# Patient Record
Sex: MALE | Race: WHITE
[De-identification: names, ages, dates, MRNs, and addresses within clinical notes are randomized per-mention and may not be internally consistent; named-entity substitution may affect disease eponyms.]

---

## 2019-08-08 ENCOUNTER — HOSPITAL ENCOUNTER (EMERGENCY)
Dept: HOSPITAL 33 - ED | Age: 34
Discharge: HOME | End: 2019-08-08
Payer: COMMERCIAL

## 2019-08-08 VITALS — SYSTOLIC BLOOD PRESSURE: 110 MMHG | DIASTOLIC BLOOD PRESSURE: 71 MMHG

## 2019-08-08 VITALS — OXYGEN SATURATION: 98 % | HEART RATE: 83 BPM

## 2019-08-08 DIAGNOSIS — S87.81XA: ICD-10-CM

## 2019-08-08 DIAGNOSIS — M79.604: Primary | ICD-10-CM

## 2019-08-08 PROCEDURE — 73590 X-RAY EXAM OF LOWER LEG: CPT

## 2019-08-08 PROCEDURE — 36000 PLACE NEEDLE IN VEIN: CPT

## 2019-08-08 PROCEDURE — 96374 THER/PROPH/DIAG INJ IV PUSH: CPT

## 2019-08-08 PROCEDURE — 96375 TX/PRO/DX INJ NEW DRUG ADDON: CPT

## 2019-08-08 PROCEDURE — 99284 EMERGENCY DEPT VISIT MOD MDM: CPT

## 2019-08-08 NOTE — XRAY
Indication: Pain following injury.



Comparison: None



2 views of the right lower leg obtained.  No bony, articular, or soft tissue

abnormalities.

## 2019-08-08 NOTE — ERPHSYRPT
- History of Present Illness


Time Seen by Provider: 08/08/19 14:08


Source: patient


Exam Limitations: no limitations


Patient Subjective Stated Complaint: states was working at [employer] today 

and got right lower leg caught between two logs.  pain to right lower leg.


Triage Nursing Assessment: to room per w/c.  skin w/d, color normal, resp easy.

  noted moderate swelling to right lower leg.  leg warm, good pedal pulse, good 

cap refill.


Physician History: 





33-year-old white male arrives with complaint of pain in his right leg symptoms 

since 30 minutes patient states his leg was caught between 2 logs sawmill.


Patient complains of pain in the right leg he states he was pinched between the 

2 logs.


Patient has pain with any movement of his foot or ankle located in the right 

mid leg.








Past medical history is negative.


Past surgical history is negative.


Social history patient denies illicit drug use or problems with drugs


Method of Injury: other (right leg caught between 2 logs at work)


Occurred: just prior to arrival (30 minutes prior to arri)


Quality: constant, aching


Severity of Pain-Max: moderate


Severity of Pain-Current: moderate


Lower Extremities Pain: leg: right


Modifying Factors: Improves With: movement (pain worse with movement of ankle 

or foot pain located at the mid leg)


Allergies/Adverse Reactions: 








No Known Drug Allergies Allergy (Unverified 08/08/19 14:10)


 





Hx Tetanus, Diphtheria Vaccination/Date Given: No


Hx Influenza Vaccination/Date Given: No


Hx Pneumococcal Vaccination/Date Given: No





- Review of Systems


Constitutional: No Fever, No Chills


Eyes: No Symptoms


Ears, Nose, & Throat: No Symptoms


Respiratory: No Cough, No Dyspnea


Cardiac: No Chest Pain, No Edema, No Syncope


Abdominal/Gastrointestinal: No Abdominal Pain, No Nausea, No Vomiting, No 

Diarrhea


Genitourinary Symptoms: No Dysuria


Musculoskeletal: Other (Pain right leg)


Skin: Other (eecchymosis over right calf)


Neurological: No Dizziness, No Focal Weakness, No Sensory Changes


Psychological: No Symptoms


Endocrine: No Symptoms


All Other Systems: Reviewed and Negative





- Past Medical History


Pertinent Past Medical History: No





- Past Surgical History


Past Surgical History: No





- Social History


Smoking Status: Former smoker


Exposure to second hand smoke: Yes


Drug Use: none


Patient Lives Alone: No





- Nursing Vital Signs


Nursing Vital Signs: 


 Initial Vital Signs











Temperature  98.1 F   08/08/19 13:58


 


Pulse Rate  83   08/08/19 13:58


 


Respiratory Rate  16   08/08/19 13:58


 


Blood Pressure  114/67   08/08/19 13:58


 


O2 Sat by Pulse Oximetry  98   08/08/19 13:58








 Pain Scale











Pain Intensity                 10

















- Physical Exam


General Appearance: moderate distress, alert


Eyes, Ears, Nose, Throat Exam: moist mucous membranes


Neck Exam: non-tender, supple


Cardiovascular/Respiratory Exam: chest non-tender, normal breath sounds, 

regular rate/rhythm, no respiratory distress


Gastrointestinal/Abdominal Exam: non-tender, guarding


Back Exam: normal inspection, No vertebral tenderness


Hips Exam: bilateral: non-tender, normal inspection, normal range of motion, no 

evidence of injury


Legs Exam: right leg: bone tenderness (pain right mid leg), ecchymosis (

ecchymosis overlying the right calf), swelling (edema right mid leg), left leg: 

non-tender, normal inspection, normal range of motion, no evidence of injury


Knees Exam: right knee: other (decreased range ofmotion right knee secondary to 

leg pain), left knee: normal range of motion, bilateral knee: non-tender, 

normal inspection


Ankle Exam: right ankle: other (decreased range of motion right ankle secondary 

to right leg pain), left ankle: normal range of motion, bilateral ankle: non-

tender, normal inspection, no evidence of injury


Foot Exam: right foot: other (decreased range of motion right ankle secondary 

to right leg pain), left foot: normal range of motion, bilateral foot: non-

tender, normal inspection


Neuro/Tendon Exam: normal sensation, normal motor functions


Mental Status Exam: alert, oriented x 3, cooperative


Skin Exam: normal color, warm, dry, other (ecchymosis right calf)


**SpO2 Interpretation**: normal (98%)


SpO2: 98





- Course


Nursing assessment & vital signs reviewed: Yes





- Radiology Exams


  ** Right Lower Leg


X-ray Interpretation: Discussed w/ radiologist, Negative, No Fracture, No 

Subluxation (no bony, articular, or soft tissue abnormalities.)


Ordered Tests: 


 Active Orders 24 hr











 Category Date Time Status


 


 Ace Bandage Application -[facility] STAT Care  08/08/19 14:49 Active


 


 Crutches STAT Care  08/08/19 14:49 Active


 


 IV Insertion STAT Care  08/08/19 14:08 Active


 


 LOWER LEG Stat Exams  08/08/19 14:09 Completed








Medication Summary














Discontinued Medications














Generic Name Dose Route Start Last Admin





  Trade Name Freq  PRN Reason Stop Dose Admin


 


Ketorolac Tromethamine  30 mg  08/08/19 14:52  





  Toradol 30 Mg Injection***  IV  08/08/19 14:53  





  STAT ONE   





     





     





     





     


 


Morphine Sulfate  4 mg  08/08/19 14:08  08/08/19 14:18





  Morphine Sulfate 4 Mg Inj***  IV  08/08/19 14:09  4 mg





  STAT ONE   Administration





     





     





     





     


 


Morphine Sulfate  Confirm  08/08/19 14:14  





  Morphine Sulfate 4 Mg Inj***  Administered  08/08/19 14:15  





  Dose   





  4 mg   





  .ROUTE   





  .STK-MED ONE   





     





     





     





     


 


Ondansetron HCl  4 mg  08/08/19 14:08  08/08/19 14:18





  Zofran 4 Mg/2 Ml Vial**  IV  08/08/19 14:09  4 mg





  STAT ONE   Administration





     





     





     





     


 


Ondansetron HCl  Confirm  08/08/19 14:13  





  Zofran 4 Mg/2 Ml Vial**  Administered  08/08/19 14:14  





  Dose   





  4 mg   





  .ROUTE   





  .STK-MED ONE   





     





     





     





     














- Progress


Progress: improved


Progress Note: 





08/08/19 14:52


Patient's x-ray right lower leg negative fracture negative subluxation negative 

soft tissue injury.








Patient with minimal ecchymosis over the right calf he does state that he is 

pain with movement of his right knee or ankle located at the mid leg.





Patient was given morphine 4 mg of Zofran 4 mg will give Toradol 60 mg.


Will have nurse apply Ace wrap.


Home on crutches.


Followup with company physician.


[place] for pain.





- Departure


Departure Disposition: Home


Clinical Impression: 


 Right leg pain





Crushing injury of right leg


Qualifiers:


 Encounter type: initial encounter Qualified Code(s): S87.81XA - [place] 

injury of right lower leg, initial encounter





Condition: Fair


Critical Care Time: No


Additional Instructions: 


Return home.


Ice and elevate right leg 24-48 hours.


Crutches weightbearing as tolerated.


Norco as prescribed.


Followup with your company physician.


Return for acute distress or for severe symptoms,.


Prescriptions: 


Hydrocodone/APAP 5-325 Tab^^^ [Norco 5-325 Tablet^^^] 1 each PO Q4HPRN PRN #10 

tablet MDD 6


 PRN Reason: right leg pain